# Patient Record
Sex: FEMALE | Race: WHITE | NOT HISPANIC OR LATINO | URBAN - METROPOLITAN AREA
[De-identification: names, ages, dates, MRNs, and addresses within clinical notes are randomized per-mention and may not be internally consistent; named-entity substitution may affect disease eponyms.]

---

## 2021-01-14 ENCOUNTER — HISTORICAL (OUTPATIENT)
Dept: SURGERY | Facility: HOSPITAL | Age: 31
End: 2021-01-14

## 2021-02-08 ENCOUNTER — HISTORICAL (OUTPATIENT)
Dept: ADMINISTRATIVE | Facility: HOSPITAL | Age: 31
End: 2021-02-08

## 2021-02-08 LAB — PROGEST SERPL-MCNC: 15.8 NG/ML

## 2021-09-02 ENCOUNTER — HISTORICAL (OUTPATIENT)
Dept: ADMINISTRATIVE | Facility: HOSPITAL | Age: 31
End: 2021-09-02

## 2021-09-02 LAB
ABS NEUT (OLG): 12.5 X10(3)/MCL (ref 2.1–9.2)
ALBUMIN SERPL-MCNC: 4.4 GM/DL (ref 3.4–5)
ALBUMIN/GLOB SERPL: 1.76 {RATIO} (ref 1.5–2.5)
ALP SERPL-CCNC: 80 UNIT/L (ref 38–126)
ALT SERPL-CCNC: 8 UNIT/L (ref 7–52)
AST SERPL-CCNC: 15 UNIT/L (ref 15–37)
BILIRUB SERPL-MCNC: 0.3 MG/DL (ref 0.2–1)
BILIRUBIN DIRECT+TOT PNL SERPL-MCNC: 0.1 MG/DL (ref 0–0.5)
BILIRUBIN DIRECT+TOT PNL SERPL-MCNC: 0.2 MG/DL
BUN SERPL-MCNC: 9 MG/DL (ref 7–18)
CALCIUM SERPL-MCNC: 9.2 MG/DL (ref 8.5–10)
CHLORIDE SERPL-SCNC: 103 MMOL/L (ref 98–107)
CO2 SERPL-SCNC: 31 MMOL/L (ref 21–32)
CREAT SERPL-MCNC: 0.71 MG/DL (ref 0.6–1.3)
ERYTHROCYTE [DISTWIDTH] IN BLOOD BY AUTOMATED COUNT: 11.4 % (ref 11.5–17)
GLOBULIN SER-MCNC: 2.5 GM/DL (ref 1.2–3)
GLUCOSE SERPL-MCNC: 84 MG/DL (ref 74–106)
HCT VFR BLD AUTO: 39.1 % (ref 37–47)
HGB BLD-MCNC: 13 GM/DL (ref 12–16)
LYMPHOCYTES # BLD AUTO: 4.5 X10(3)/MCL (ref 0.6–3.4)
LYMPHOCYTES NFR BLD AUTO: 24.7 % (ref 13–40)
MCH RBC QN AUTO: 30 PG (ref 27–31.2)
MCHC RBC AUTO-ENTMCNC: 33 GM/DL (ref 32–36)
MCV RBC AUTO: 90 FL (ref 80–94)
MONOCYTES # BLD AUTO: 1.4 X10(3)/MCL (ref 0.1–1.3)
MONOCYTES NFR BLD AUTO: 7.5 % (ref 0.1–24)
NEUTROPHILS NFR BLD AUTO: 67.8 % (ref 47–80)
PLATELET # BLD AUTO: 355 X10(3)/MCL (ref 130–400)
PMV BLD AUTO: 8.8 FL (ref 9.4–12.4)
POTASSIUM SERPL-SCNC: 3.9 MMOL/L (ref 3.5–5.1)
PROT SERPL-MCNC: 6.9 GM/DL (ref 6.4–8.2)
RBC # BLD AUTO: 4.34 X10(6)/MCL (ref 4.2–5.4)
SODIUM SERPL-SCNC: 141 MMOL/L (ref 136–145)
WBC # SPEC AUTO: 18.4 X10(3)/MCL (ref 4.5–11.5)

## 2021-09-16 ENCOUNTER — HISTORICAL (OUTPATIENT)
Dept: ADMINISTRATIVE | Facility: HOSPITAL | Age: 31
End: 2021-09-16

## 2021-09-16 LAB
ABS NEUT (OLG): 7 X10(3)/MCL (ref 2.1–9.2)
ERYTHROCYTE [DISTWIDTH] IN BLOOD BY AUTOMATED COUNT: 12.3 % (ref 11.5–17)
HCT VFR BLD AUTO: 39.7 % (ref 37–47)
HGB BLD-MCNC: 13.1 GM/DL (ref 12–16)
LYMPHOCYTES # BLD AUTO: 4.2 X10(3)/MCL (ref 0.6–3.4)
LYMPHOCYTES NFR BLD AUTO: 34.2 % (ref 13–40)
MCH RBC QN AUTO: 29.8 PG (ref 27–31.2)
MCHC RBC AUTO-ENTMCNC: 33 GM/DL (ref 32–36)
MCV RBC AUTO: 90 FL (ref 80–94)
MONOCYTES # BLD AUTO: 1.2 X10(3)/MCL (ref 0.1–1.3)
MONOCYTES NFR BLD AUTO: 10 % (ref 0.1–24)
NEUTROPHILS NFR BLD AUTO: 55.8 % (ref 47–80)
PLATELET # BLD AUTO: 376 X10(3)/MCL (ref 130–400)
PMV BLD AUTO: 8.5 FL (ref 9.4–12.4)
RBC # BLD AUTO: 4.39 X10(6)/MCL (ref 4.2–5.4)
WBC # SPEC AUTO: 12.4 X10(3)/MCL (ref 4.5–11.5)

## 2021-11-08 ENCOUNTER — HISTORICAL (OUTPATIENT)
Dept: ADMINISTRATIVE | Facility: HOSPITAL | Age: 31
End: 2021-11-08

## 2021-11-15 ENCOUNTER — HISTORICAL (OUTPATIENT)
Dept: ADMINISTRATIVE | Facility: HOSPITAL | Age: 31
End: 2021-11-15

## 2021-11-29 ENCOUNTER — HISTORICAL (OUTPATIENT)
Dept: RADIOLOGY | Facility: HOSPITAL | Age: 31
End: 2021-11-29

## 2022-04-30 NOTE — OP NOTE
DATE OF SURGERY:    01/14/2021    SURGEON:  Darrion Ferreira MD    PREOPERATIVE DIAGNOSIS:  Unacceptable cosmetic appearance of breast.    POSTOPERATIVE DIAGNOSIS:  Unacceptable cosmetic appearance of breast.    PROCEDURE:  Bilateral saline implant removal and mastopexy.    INDICATION FOR PROCEDURE:  Sherri Mondragon is a 30-year-old female who is unhappy with the appearance of her breasts.  She has implants which she no longer wants and she also has some ptosis of the remaining breast tissue.  She presents for revision.    ANESTHESIA:  General.    COMPLICATIONS:  None.    PROCEDURE IN DETAIL:  The patient was endotracheally intubated, prepped and draped in the usual sterile fashion.  Following the previous Wise pattern markings, I first used a 38 mm cookie cutter to cut out the nipples bilaterally.  I then made a vertical incision through the breast parenchyma using a 10 blade scalpel followed by Bovie cautery to enter the implant capsule, first on the left, then on the right.  The saline implant was first removed on the left and then the implant was removed on the right.  After this was complete, we sat the patient up and tailor tacked a Lozano pattern.  This would be excellent removal of the skin and a lift.  She had too much skin to perform vertical mastopexy.  I then laid the patient down.  We de-epithelialized the entire Wise pattern on the left breast followed by the right breast.  Once the de-epithelialization was completed, I then raised the superior flap, first on the left side using Bovie cautery down the chest wall and created a pocket superior to the pectoralis major up to the clavicle.  I then created a pocket on the left side in a similar fashion, creating superior flaps, and dissecting down the pectoralis major muscle and creating a superior pocket circumferentially.  I then created hemostasis, irrigated out the operative sterile saline solution, and then stapled the skin in place for later sewing.   We then closed all incisions bringing up the areola through the superior flap using a 15 blade scalpel after sitting the patient up and then determining ideal nipple placement.  The 38 mm cookie cutter was used to assist with this.  The nipple was inset using interrupted 3-0 Vicryl suture.  The remaining tissue was closed using interrupted 0     Vicryl suture.  3-0 Monocryl was used to close the skin.  Sterile dressing was applied.  No complications.  I was scrubbed and present throughout procedure.        ______________________________  MD ALMA DELIA Mead/UA  DD:  01/14/2021  Time:  09:39AM  DT:  01/14/2021  Time:  09:51AM  Job #:  [id number]